# Patient Record
Sex: MALE | Race: OTHER | HISPANIC OR LATINO | URBAN - METROPOLITAN AREA
[De-identification: names, ages, dates, MRNs, and addresses within clinical notes are randomized per-mention and may not be internally consistent; named-entity substitution may affect disease eponyms.]

---

## 2022-05-03 ENCOUNTER — EMERGENCY (EMERGENCY)
Facility: HOSPITAL | Age: 42
LOS: 1 days | Discharge: HOME | End: 2022-05-03
Attending: EMERGENCY MEDICINE | Admitting: EMERGENCY MEDICINE
Payer: SELF-PAY

## 2022-05-03 VITALS
TEMPERATURE: 99 F | RESPIRATION RATE: 18 BRPM | SYSTOLIC BLOOD PRESSURE: 108 MMHG | HEART RATE: 110 BPM | DIASTOLIC BLOOD PRESSURE: 85 MMHG | OXYGEN SATURATION: 100 %

## 2022-05-03 LAB
ALBUMIN SERPL ELPH-MCNC: 4.6 G/DL — SIGNIFICANT CHANGE UP (ref 3.3–5)
ALP SERPL-CCNC: 83 U/L — SIGNIFICANT CHANGE UP (ref 40–120)
ALT FLD-CCNC: 13 U/L — SIGNIFICANT CHANGE UP (ref 4–41)
ANION GAP SERPL CALC-SCNC: 13 MMOL/L — SIGNIFICANT CHANGE UP (ref 7–14)
APAP SERPL-MCNC: <10 UG/ML — LOW (ref 15–25)
AST SERPL-CCNC: 18 U/L — SIGNIFICANT CHANGE UP (ref 4–40)
BASOPHILS # BLD AUTO: 0.07 K/UL — SIGNIFICANT CHANGE UP (ref 0–0.2)
BASOPHILS NFR BLD AUTO: 0.9 % — SIGNIFICANT CHANGE UP (ref 0–2)
BILIRUB SERPL-MCNC: <0.2 MG/DL — SIGNIFICANT CHANGE UP (ref 0.2–1.2)
BUN SERPL-MCNC: 7 MG/DL — SIGNIFICANT CHANGE UP (ref 7–23)
CALCIUM SERPL-MCNC: 9 MG/DL — SIGNIFICANT CHANGE UP (ref 8.4–10.5)
CHLORIDE SERPL-SCNC: 107 MMOL/L — SIGNIFICANT CHANGE UP (ref 98–107)
CO2 SERPL-SCNC: 21 MMOL/L — LOW (ref 22–31)
CREAT SERPL-MCNC: 0.64 MG/DL — SIGNIFICANT CHANGE UP (ref 0.5–1.3)
EGFR: 121 ML/MIN/1.73M2 — SIGNIFICANT CHANGE UP
EOSINOPHIL # BLD AUTO: 0.07 K/UL — SIGNIFICANT CHANGE UP (ref 0–0.5)
EOSINOPHIL NFR BLD AUTO: 0.9 % — SIGNIFICANT CHANGE UP (ref 0–6)
ETHANOL SERPL-MCNC: 76 MG/DL — HIGH
GLUCOSE SERPL-MCNC: 101 MG/DL — HIGH (ref 70–99)
HCT VFR BLD CALC: 45.8 % — SIGNIFICANT CHANGE UP (ref 39–50)
HGB BLD-MCNC: 15.9 G/DL — SIGNIFICANT CHANGE UP (ref 13–17)
IANC: 4.78 K/UL — SIGNIFICANT CHANGE UP (ref 1.8–7.4)
IMM GRANULOCYTES NFR BLD AUTO: 0.1 % — SIGNIFICANT CHANGE UP (ref 0–1.5)
LYMPHOCYTES # BLD AUTO: 2.14 K/UL — SIGNIFICANT CHANGE UP (ref 1–3.3)
LYMPHOCYTES # BLD AUTO: 28.2 % — SIGNIFICANT CHANGE UP (ref 13–44)
MCHC RBC-ENTMCNC: 28.4 PG — SIGNIFICANT CHANGE UP (ref 27–34)
MCHC RBC-ENTMCNC: 34.7 GM/DL — SIGNIFICANT CHANGE UP (ref 32–36)
MCV RBC AUTO: 81.9 FL — SIGNIFICANT CHANGE UP (ref 80–100)
MONOCYTES # BLD AUTO: 0.53 K/UL — SIGNIFICANT CHANGE UP (ref 0–0.9)
MONOCYTES NFR BLD AUTO: 7 % — SIGNIFICANT CHANGE UP (ref 2–14)
NEUTROPHILS # BLD AUTO: 4.78 K/UL — SIGNIFICANT CHANGE UP (ref 1.8–7.4)
NEUTROPHILS NFR BLD AUTO: 62.9 % — SIGNIFICANT CHANGE UP (ref 43–77)
NRBC # BLD: 0 /100 WBCS — SIGNIFICANT CHANGE UP
NRBC # FLD: 0 K/UL — SIGNIFICANT CHANGE UP
PLATELET # BLD AUTO: 245 K/UL — SIGNIFICANT CHANGE UP (ref 150–400)
POTASSIUM SERPL-MCNC: 4 MMOL/L — SIGNIFICANT CHANGE UP (ref 3.5–5.3)
POTASSIUM SERPL-SCNC: 4 MMOL/L — SIGNIFICANT CHANGE UP (ref 3.5–5.3)
PROT SERPL-MCNC: 7.7 G/DL — SIGNIFICANT CHANGE UP (ref 6–8.3)
RBC # BLD: 5.59 M/UL — SIGNIFICANT CHANGE UP (ref 4.2–5.8)
RBC # FLD: 13.2 % — SIGNIFICANT CHANGE UP (ref 10.3–14.5)
SALICYLATES SERPL-MCNC: <0.3 MG/DL — LOW (ref 15–30)
SODIUM SERPL-SCNC: 141 MMOL/L — SIGNIFICANT CHANGE UP (ref 135–145)
TOXICOLOGY SCREEN, DRUGS OF ABUSE, SERUM RESULT: SIGNIFICANT CHANGE UP
TSH SERPL-MCNC: 0.68 UIU/ML — SIGNIFICANT CHANGE UP (ref 0.27–4.2)
WBC # BLD: 7.6 K/UL — SIGNIFICANT CHANGE UP (ref 3.8–10.5)
WBC # FLD AUTO: 7.6 K/UL — SIGNIFICANT CHANGE UP (ref 3.8–10.5)

## 2022-05-03 PROCEDURE — 93010 ELECTROCARDIOGRAM REPORT: CPT

## 2022-05-03 PROCEDURE — 99284 EMERGENCY DEPT VISIT MOD MDM: CPT | Mod: 25

## 2022-05-03 NOTE — ED BEHAVIORAL HEALTH ASSESSMENT NOTE - NSSUICPROTFACT_PSY_ALL_CORE
Responsibility to children, family, or others/Identifies reasons for living/Fear of death or the actual act of killing self/Positive therapeutic relationships/Moravian beliefs
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
## 2022-05-03 NOTE — ED BEHAVIORAL HEALTH ASSESSMENT NOTE - VIOLENCE RISK FACTORS:
Feeling of being under threat and being unable to control threat/Substance abuse/Affective dysregulation/Impulsivity/Noncompliance with treatment
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
## 2022-05-03 NOTE — ED BEHAVIORAL HEALTH ASSESSMENT NOTE - SAFETY PLAN ADDT'L DETAILS
Safety plan discussed with.../Education provided regarding environmental safety / lethal means restriction/Provision of National Suicide Prevention Lifeline 4-061-749-ZKMA (3720)
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
## 2022-05-03 NOTE — ED BEHAVIORAL HEALTH ASSESSMENT NOTE - HOMICIDALITY / AGGRESSION (CURRENT/PAST)
----- Message from Nicole Segovia sent at 3/13/2020  7:14 PM EDT -----  Regarding: Prescription Question  Contact: [phone]  My wife, Dell Vinson, needs a refill of her Hydrochlorothiazide Tabs 25 mg  Please send prescription to [facility]  Yes

## 2022-05-03 NOTE — ED BEHAVIORAL HEALTH ASSESSMENT NOTE - DESCRIPTION
Since his  ED arrival, the Pt has been calm and cooperative.  There has been no recurrence of agitation/aggressive behavior.  Has not verbalized any active/ passive SI/HI.   He is not psychomotorically retarded.. is not exhibiting any signs/symptoms of acute sheree or florid psychosis.. He is not formally thought disordered and is able to engage towards a meaningful conversation.  Pt is not showing any signs/symptoms of withdrawal.  He is not delirious.  Pt has not tested limits.. Has maintained appropriate boundaries. Pt has been easily redirected.  Overall, there has been no management issues.      Vital Signs Last 24 Hrs  T(C): 37.2 (03 May 2022 20:05), Max: 37.2 (03 May 2022 20:05)  T(F): 99 (03 May 2022 20:05), Max: 99 (03 May 2022 20:05)  HR: 110 (03 May 2022 20:05) (110 - 110)  BP: 108/85 (03 May 2022 20:05) (108/85 - 108/85)  BP(mean): --  RR: 18 (03 May 2022 20:05) (18 - 18)  SpO2: 100% (03 May 2022 20:05) (100% - 100%)    BAL 76 at 10:20PM NONE , most of family are in [facility]. has a son here in [place]. 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 denied any access to guns

## 2022-05-03 NOTE — ED PROVIDER NOTE - PATIENT PORTAL LINK FT
You can access the FollowMyHealth Patient Portal offered by [facility] by registering at the following website: [email or web address]. By joining [employer]’s FollowMyHealth portal, you will also be able to view your health information using other applications (apps) compatible with our system.
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
## 2022-05-03 NOTE — ED ADULT NURSE NOTE - NSIMPLEMENTINTERV_GEN_ALL_ED
Implemented All Fall Risk Interventions:  [place] to call system. Call bell, personal items and telephone within reach. Instruct patient to call for assistance. Room bathroom lighting operational. Non-slip footwear when patient is off stretcher. Physically safe environment: no spills, clutter or unnecessary equipment. Stretcher in lowest position, wheels locked, appropriate side rails in place. Provide visual cue, wrist band, yellow gown, etc. Monitor gait and stability. Monitor for mental status changes and reorient to person, place, and time. Review medications for side effects contributing to fall risk. Reinforce activity limits and safety measures with patient and family.
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
## 2022-05-03 NOTE — ED BEHAVIORAL HEALTH ASSESSMENT NOTE - NSBHROSSYSTEMS_PSY_ALL_CORE
Pt currently denied any headaches, no dizziness, no blurring of vision; no sorethroat; no cough, no fever. no chest pains, no SOB, no palpitations, no abdominal pains, no nausea/ vomiting/ diarrhea, no dysuria, no hesitancy, no arthralgia/ no pruritus. denied any muscle/ joint pains/Gastrointestinal.../Musculoskeletal...

## 2022-05-03 NOTE — ED BEHAVIORAL HEALTH ASSESSMENT NOTE - RISK ASSESSMENT
Low Acute Suicide Risk RISK FACTORS:  Modifiable risk factors: depression, anxiety, ?personality pathology  Unmodifiable risk factors: middle aged male with recent hx of aggressive/ violent behavior, not in psych treatment, co-morbid substance use  Protective factors: currently denies (and no objective evidence of) suicidality, no hx of SA or any self injurious behaviors,  no family hx of SA, Domiciled, future-oriented, maintains good relationship with family, endorses responsibility to family, no access to lethal means like guns,  no complex medical issues or chronic pains, is not severe depressed, is not acutely manic or floridly psychotic, Jain    Given above, the Pt is currently at low acute suicide risk and is not at chronically elevated risk of self-harm.  At this time,  there are no identifiable acute increase in risk(s) that would be mitigated by an involuntary psychiatric admission. The Pt remains appropriate for current level of care. Modifiable risk factors will be addressed once psych care on an out-Pt treatment basis has been established.
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
## 2022-05-03 NOTE — ED BEHAVIORAL HEALTH NOTE - BEHAVIORAL HEALTH NOTE
As per request of provider, writer contacted patient’s son winder reyes morales ([phone]) with use of  ID#[id number]. The following information is per patient’s son.    Patient is a 40 YO male domiciled with the patient’s adult son and uncle. Patient’s son was not aware of patient being in the ED. The son spoke with the patent earlier today and reports he did not appear well. The son reports that some days the patient is fine and others his mind is not in control. The son reports that the patient feels like somebody is controlling his brain and gets scared easily by loud noises. The son suspects loud noises from upstairs could of triggered the patient. The son reports the patient isolates in his room, is afraid and reports hearing voices telling the patient not to eat or work. The son reports he doesn’t understand what is wrong with the father. The son says the patient has a hx of using alcohol and stopped 8-9 months ago because his brain got sick. The son took him to [facility] 5 months ago but says nothing resulted from this and the doctors did not share information with the son.  The son does not believe the patient is connected to MH services. The son reports the patient has not endorsed any SI or HI and has no hx of being aggressive. The son does not believe the patient is currently drinking. The son says the patient makes sense sometimes and he is able to hold a conversation with the patient. The son reports the patient’s hygiene, appetite and sleep are poor. The son did not report any medical problems and says the patient is covid vaccinated. The son says the patient has not worked in 8-9 months since he became sick. Writer agreed to keep the son updated. As per request of provider, writer contacted patient’s son winder reyes morales ([phone]) with use of  ID#[id number]. The following information is per patient’s son.    Patient is a 42 YO male domiciled with the patient’s adult son and uncle. Patient’s son was not aware of patient being in the ED. The son spoke with the patent earlier today and reports he did not appear well. The son reports that some days the patient is fine and others his mind is not in control. The son reports that the patient feels like somebody is controlling his brain and gets scared easily by loud noises. The son suspects loud noises from upstairs could of triggered the patient. The son reports the patient isolates in his room, is afraid and reports hearing voices telling the patient not to eat or work. The son reports he doesn’t understand what is wrong with the father. The son says the patient has a hx of using alcohol and stopped 8-9 months ago because his brain got sick. The son took him to [facility] 5 months ago but says nothing resulted from this and the doctors did not share information with the son.  The son does not believe the patient is connected to MH services. The son reports the patient has not endorsed any SI or HI and has no hx of being aggressive. The son does not believe the patient is currently drinking. The son says the patient makes sense sometimes and he is able to hold a conversation with the patient. The son reports the patient’s hygiene, appetite and sleep are poor. The son did not report any medical problems and says the patient is covid vaccinated. The son says the patient has not worked in 8-9 months since he became sick. Writer agreed to keep the son updated.    writer contacted patient’s son winder reyes morales ([phone]) with use of  ID#[id number]. Writer informed him that patient was psychiatrically clear and to f/u with [facility] ([phone]). Patient's son agreed to follow up.

## 2022-05-03 NOTE — ED BEHAVIORAL HEALTH ASSESSMENT NOTE - DIFFERENTIAL
adjustment disorder  substance induced mood disorder/ psychosis  axis II pathology (cluster A vs cluster B)  depressive disorder, anxiety disorder NOS  MDD  psychosis NOS (previous paranoia + ? AH) - whether this was primary psychotic vs substance induced cannot be ascertained from this ED evaluation

## 2022-05-03 NOTE — ED PROVIDER NOTE - NSFOLLOWUPINSTRUCTIONS_ED_ALL_ED_FT
Follow up with your primary care physician and psychiatrist in 48-72 hours.  You may also see the psychiatrist at [facility]:    [address] [place] [address]  Phone: [phone]      SEEK IMMEDIATE MEDICAL CARE IF YOU HAVE ANY OF THE FOLLOWING SYMPTOMS: thoughts about hurting or killing yourself, thoughts about hurting or killing somebody else, hallucinations or any other worsening or persistent symptoms OR ANY NEW OR CONCERNING SYMPTOMS.

## 2022-05-04 VITALS — RESPIRATION RATE: 16 BRPM | HEART RATE: 98 BPM

## 2022-05-04 DIAGNOSIS — F14.10 COCAINE ABUSE, UNCOMPLICATED: ICD-10-CM

## 2022-05-04 DIAGNOSIS — F43.25 ADJUSTMENT DISORDER WITH MIXED DISTURBANCE OF EMOTIONS AND CONDUCT: ICD-10-CM

## 2022-05-04 DIAGNOSIS — F10.10 ALCOHOL ABUSE, UNCOMPLICATED: ICD-10-CM

## 2022-05-04 LAB
COVID-19 SPIKE DOMAIN AB INTERP: POSITIVE
COVID-19 SPIKE DOMAIN ANTIBODY RESULT: >250 U/ML — HIGH
FLUAV AG NPH QL: SIGNIFICANT CHANGE UP
FLUBV AG NPH QL: SIGNIFICANT CHANGE UP
RSV RNA NPH QL NAA+NON-PROBE: SIGNIFICANT CHANGE UP
SARS-COV-2 IGG+IGM SERPL QL IA: >250 U/ML — HIGH
SARS-COV-2 IGG+IGM SERPL QL IA: POSITIVE
SARS-COV-2 RNA SPEC QL NAA+PROBE: SIGNIFICANT CHANGE UP
